# Patient Record
Sex: FEMALE | Race: WHITE | NOT HISPANIC OR LATINO | URBAN - METROPOLITAN AREA
[De-identification: names, ages, dates, MRNs, and addresses within clinical notes are randomized per-mention and may not be internally consistent; named-entity substitution may affect disease eponyms.]

---

## 2022-12-17 ENCOUNTER — OFFICE VISIT (OUTPATIENT)
Dept: URGENT CARE | Facility: CLINIC | Age: 47
End: 2022-12-17

## 2022-12-17 VITALS — HEART RATE: 87 BPM | RESPIRATION RATE: 14 BRPM | OXYGEN SATURATION: 97 % | TEMPERATURE: 97.5 F

## 2022-12-17 DIAGNOSIS — J20.9 ACUTE BRONCHITIS, UNSPECIFIED ORGANISM: Primary | ICD-10-CM

## 2022-12-17 RX ORDER — DEXTROMETHORPHAN HYDROBROMIDE AND PROMETHAZINE HYDROCHLORIDE 15; 6.25 MG/5ML; MG/5ML
5 SOLUTION ORAL 4 TIMES DAILY PRN
Qty: 118 ML | Refills: 0 | Status: SHIPPED | OUTPATIENT
Start: 2022-12-17 | End: 2022-12-24

## 2022-12-17 RX ORDER — FAMOTIDINE 10 MG
10 TABLET ORAL 2 TIMES DAILY
COMMUNITY

## 2022-12-17 RX ORDER — ALBUTEROL SULFATE 90 UG/1
2 AEROSOL, METERED RESPIRATORY (INHALATION) EVERY 6 HOURS PRN
Qty: 6.7 G | Refills: 0 | Status: SHIPPED | OUTPATIENT
Start: 2022-12-17

## 2022-12-17 NOTE — PATIENT INSTRUCTIONS
Acute Bronchitis:   -Based on the patients hx and presentation they are likely suffering from a Viral illness  No sign of bacterial infection at this time  Will prescribe promethazine cough medication to aid with nighttime coughing  Precautions discussed  This will make you drowsy  Will also prescribe albuterol inhaler as patient states she has wheezing at night    -Stay very well hydrated and push fluids  -Run a humidifier by your bed and take steam showers to clear mucus   -Zicam nasal AllClear can be soothing to the nasal passages   -You can use flonase once daily for two weeks   -Sleep with pillows propping you up  -Advil or Tylenol for fever or pain  -Mucinex OTC or Zyrtec or Claritin  Drink plenty of water with the mucinex    -Honey or throat lozenges for cough may be helpful  -Warm salt water gargles and tea with honey   -Obtain a pulse ox device and check your O2 1-2 times a day  You want your oxygen levels to be >93%  If they go below 93% go to the ED immediately  -Vitamin C and D daily   You can attempt to use zinc or Zicam    -If your sx worsen or persist follow up with your PCP for recheck

## 2022-12-17 NOTE — PROGRESS NOTES
[facility]        NAME: Trace Hernandez is a 50 y o  female  : 1975    MRN: [id number]  DATE: 2022  TIME: 9:51 AM    Assessment and Plan   Acute bronchitis, unspecified organism [J20 [id number]]  1  Acute bronchitis, unspecified organism  Promethazine-DM (PHENERGAN-DM) 6 25-15 mg/5 mL oral syrup    albuterol (Proventil HFA) 90 mcg/act inhaler            Patient Instructions   Acute Bronchitis:   -Based on the patients hx and presentation they are likely suffering from a Viral illness  No sign of bacterial infection at this time  Will prescribe promethazine cough medication to aid with nighttime coughing  Precautions discussed  This will make you drowsy  Will also prescribe albuterol inhaler as patient states she has wheezing at night    -Stay very well hydrated and push fluids  -Run a humidifier by your bed and take steam showers to clear mucus   -Zicam nasal AllClear can be soothing to the nasal passages   -You can use flonase once daily for two weeks   -Sleep with pillows propping you up  -Advil or Tylenol for fever or pain  -Mucinex OTC or Zyrtec or Claritin  Drink plenty of water with the mucinex    -Honey or throat lozenges for cough may be helpful  -Warm salt water gargles and tea with honey   -Obtain a pulse ox device and check your O2 1-2 times a day  You want your oxygen levels to be >93%  If they go below 93% go to the ED immediately  -Vitamin C and D daily  You can attempt to use zinc or Zicam    -If your sx worsen or persist follow up with your PCP for recheck        Follow up with PCP in 3-5 days  Proceed to  ER if symptoms worsen  Chief Complaint     Chief Complaint   Patient presents with   • Cold Like Symptoms     Pt presents with cough, hoarse voice ongoing for 2-3 weeks; History of Present Illness       The patient is a 13-year-old female who presents today for a 2 week hx of coughing, congestion and voice hoarseness   She states that two weeks ago her sx began with congestion and sore throat and dry coughing  She states that today she has coughing all night long that is now productive of a brown color  She has mild wheezing at night but states that her sx mostly clear throughout the day  No fever, chills, body aches  No dyspnea, chest tightness, cp, palpitations  No dizziness or weakness  No GI sx  Good PO intake  No loss of taste or smell  No lower extremity edema  No hx of asthma or smoking  No known sick contacts or recent travel  COVID test negative  She states that she gets "chronic bronchitis yearly"  Review of Systems   Review of Systems   Constitutional: Positive for fatigue  Negative for activity change, appetite change, chills, diaphoresis and fever  HENT: Positive for congestion  Negative for ear discharge, ear pain, facial swelling, hearing loss, postnasal drip, rhinorrhea, sinus pressure, sinus pain, sore throat, tinnitus, trouble swallowing and voice change  Eyes: Negative for visual disturbance  Respiratory: Positive for cough and wheezing  Negative for apnea, chest tightness, shortness of breath and stridor  Cardiovascular: Negative for chest pain, palpitations and leg swelling  Gastrointestinal: Negative for abdominal distention and abdominal pain  Genitourinary: Negative for decreased urine volume  Musculoskeletal: Negative for arthralgias, joint swelling, myalgias, neck pain and neck stiffness  Skin: Negative for rash  Allergic/Immunologic: Negative for immunocompromised state  Neurological: Negative for dizziness, weakness, light-headedness, numbness and headaches  Hematological: Negative for adenopathy           Current Medications       Current Outpatient Medications:   •  albuterol (Proventil HFA) 90 mcg/act inhaler, Inhale 2 puffs every 6 (six) hours as needed for wheezing or shortness of breath, Disp: 6 7 g, Rfl: 0  •  famotidine (PEPCID) 10 mg tablet, Take 10 mg by mouth 2 (two) times a day, Disp: , Rfl: •  Levonorgestrel (MIRENA) 20 MCG/DAY IUD, 1 each by Intrauterine route once, Disp: , Rfl:   •  Promethazine-DM (PHENERGAN-DM) 6 25-15 mg/5 mL oral syrup, Take 5 mL by mouth 4 (four) times a day as needed for cough for up to 7 days, Disp: 118 mL, Rfl: 0    Current Allergies     Allergies as of 12/17/2022   • (No Known Allergies)            The following portions of the patient's history were reviewed and updated as appropriate: allergies, current medications, past family history, past medical history, past social history, past surgical history and problem list      Past Medical History:   Diagnosis Date   • GERD (gastroesophageal reflux disease)        Past Surgical History:   Procedure Laterality Date   • KNEE SURGERY Right        History reviewed  No pertinent family history  Medications have been verified  Objective   Pulse 87   Temp 97 5 °F (36 4 °C)   Resp 14   SpO2 97%   No LMP recorded  (Menstrual status: Birth Control)  Physical Exam     Physical Exam  Vitals and nursing note reviewed  Constitutional:       General: She is not in acute distress  Appearance: She is well-developed  She is not diaphoretic  HENT:      Head: Normocephalic and atraumatic  Right Ear: Hearing, tympanic membrane, ear canal and external ear normal       Left Ear: Hearing, tympanic membrane, ear canal and external ear normal       Nose: Nose normal  No mucosal edema or rhinorrhea  Right Sinus: No maxillary sinus tenderness or frontal sinus tenderness  Left Sinus: No maxillary sinus tenderness or frontal sinus tenderness  Mouth/Throat:      Pharynx: Uvula midline  No oropharyngeal exudate, posterior oropharyngeal erythema or uvula swelling  Tonsils: No tonsillar abscesses  Cardiovascular:      Rate and Rhythm: Normal rate and regular rhythm  Heart sounds: S1 normal and S2 normal  Heart sounds not distant  No murmur heard  No friction rub  No gallop  No S3 or S4 sounds  Pulmonary:      Effort: No tachypnea, bradypnea, accessory muscle usage or respiratory distress  Breath sounds: Normal breath sounds and air entry  No stridor, decreased air movement or transmitted upper airway sounds  No decreased breath sounds, wheezing, rhonchi or rales  Musculoskeletal:      Cervical back: Normal range of motion and neck supple  Lymphadenopathy:      Cervical: No cervical adenopathy  Neurological:      Mental Status: She is alert and oriented to person, place, and time     Psychiatric:         Behavior: Behavior normal